# Patient Record
Sex: MALE | Race: OTHER | HISPANIC OR LATINO | URBAN - METROPOLITAN AREA
[De-identification: names, ages, dates, MRNs, and addresses within clinical notes are randomized per-mention and may not be internally consistent; named-entity substitution may affect disease eponyms.]

---

## 2022-12-10 ENCOUNTER — EMERGENCY (EMERGENCY)
Facility: HOSPITAL | Age: 32
LOS: 0 days | Discharge: HOME | End: 2022-12-10
Attending: EMERGENCY MEDICINE
Payer: SELF-PAY

## 2022-12-10 VITALS
RESPIRATION RATE: 18 BRPM | OXYGEN SATURATION: 100 % | SYSTOLIC BLOOD PRESSURE: 134 MMHG | TEMPERATURE: 98 F | DIASTOLIC BLOOD PRESSURE: 68 MMHG

## 2022-12-10 VITALS — WEIGHT: 160.06 LBS
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
## 2022-12-10 DIAGNOSIS — Z87.898 PERSONAL HISTORY OF OTHER SPECIFIED CONDITIONS: ICD-10-CM

## 2022-12-10 DIAGNOSIS — Z91.14 PATIENT'S OTHER NONCOMPLIANCE WITH MEDICATION REGIMEN: ICD-10-CM

## 2022-12-10 DIAGNOSIS — T42.4X6A UNDERDOSING OF BENZODIAZEPINES, INITIAL ENCOUNTER: ICD-10-CM

## 2022-12-10 DIAGNOSIS — F13.939 SEDATIVE, HYPNOTIC OR ANXIOLYTIC USE, UNSPECIFIED WITH WITHDRAWAL, UNSPECIFIED: ICD-10-CM

## 2022-12-10 DIAGNOSIS — R25.1 TREMOR, UNSPECIFIED: ICD-10-CM

## 2022-12-10 DIAGNOSIS — T46.5X6A UNDERDOSING OF OTHER ANTIHYPERTENSIVE DRUGS, INITIAL ENCOUNTER: ICD-10-CM

## 2022-12-10 DIAGNOSIS — I10 ESSENTIAL (PRIMARY) HYPERTENSION: ICD-10-CM

## 2022-12-10 DIAGNOSIS — F41.9 ANXIETY DISORDER, UNSPECIFIED: ICD-10-CM

## 2022-12-10 DIAGNOSIS — R07.9 CHEST PAIN, UNSPECIFIED: ICD-10-CM

## 2022-12-10 DIAGNOSIS — R63.0 ANOREXIA: ICD-10-CM

## 2022-12-10 LAB
ALBUMIN SERPL ELPH-MCNC: 4.1 G/DL — SIGNIFICANT CHANGE UP (ref 3.3–5)
ALP SERPL-CCNC: 77 U/L — SIGNIFICANT CHANGE UP (ref 40–120)
ALT FLD-CCNC: 42 U/L — SIGNIFICANT CHANGE UP (ref 12–78)
ANION GAP SERPL CALC-SCNC: 6 MMOL/L — SIGNIFICANT CHANGE UP (ref 5–17)
AST SERPL-CCNC: 21 U/L — SIGNIFICANT CHANGE UP (ref 15–37)
BASOPHILS # BLD AUTO: 0.04 K/UL — SIGNIFICANT CHANGE UP (ref 0–0.2)
BASOPHILS NFR BLD AUTO: 0.4 % — SIGNIFICANT CHANGE UP (ref 0–2)
BILIRUB SERPL-MCNC: 0.3 MG/DL — SIGNIFICANT CHANGE UP (ref 0.2–1.2)
BUN SERPL-MCNC: 16 MG/DL — SIGNIFICANT CHANGE UP (ref 7–23)
CALCIUM SERPL-MCNC: 9.4 MG/DL — SIGNIFICANT CHANGE UP (ref 8.5–10.1)
CHLORIDE SERPL-SCNC: 105 MMOL/L — SIGNIFICANT CHANGE UP (ref 96–108)
CO2 SERPL-SCNC: 27 MMOL/L — SIGNIFICANT CHANGE UP (ref 22–31)
CREAT SERPL-MCNC: 0.82 MG/DL — SIGNIFICANT CHANGE UP (ref 0.5–1.3)
EGFR: 120 ML/MIN/1.73M2 — SIGNIFICANT CHANGE UP
EOSINOPHIL # BLD AUTO: 0.63 K/UL — HIGH (ref 0–0.5)
EOSINOPHIL NFR BLD AUTO: 6.5 % — HIGH (ref 0–6)
ETHANOL SERPL-MCNC: <10 MG/DL — SIGNIFICANT CHANGE UP (ref 0–10)
GLUCOSE SERPL-MCNC: 106 MG/DL — HIGH (ref 70–99)
HCT VFR BLD CALC: 46.4 % — SIGNIFICANT CHANGE UP (ref 39–50)
HGB BLD-MCNC: 15.8 G/DL — SIGNIFICANT CHANGE UP (ref 13–17)
IMM GRANULOCYTES NFR BLD AUTO: 0.2 % — SIGNIFICANT CHANGE UP (ref 0–0.9)
LIDOCAIN IGE QN: 105 U/L — SIGNIFICANT CHANGE UP (ref 73–393)
LYMPHOCYTES # BLD AUTO: 2.61 K/UL — SIGNIFICANT CHANGE UP (ref 1–3.3)
LYMPHOCYTES # BLD AUTO: 26.9 % — SIGNIFICANT CHANGE UP (ref 13–44)
MCHC RBC-ENTMCNC: 33.1 PG — SIGNIFICANT CHANGE UP (ref 27–34)
MCHC RBC-ENTMCNC: 34.1 GM/DL — SIGNIFICANT CHANGE UP (ref 32–36)
MCV RBC AUTO: 97.3 FL — SIGNIFICANT CHANGE UP (ref 80–100)
MONOCYTES # BLD AUTO: 0.77 K/UL — SIGNIFICANT CHANGE UP (ref 0–0.9)
MONOCYTES NFR BLD AUTO: 7.9 % — SIGNIFICANT CHANGE UP (ref 2–14)
NEUTROPHILS # BLD AUTO: 5.64 K/UL — SIGNIFICANT CHANGE UP (ref 1.8–7.4)
NEUTROPHILS NFR BLD AUTO: 58.1 % — SIGNIFICANT CHANGE UP (ref 43–77)
PCP SPEC-MCNC: SIGNIFICANT CHANGE UP
PLATELET # BLD AUTO: 337 K/UL — SIGNIFICANT CHANGE UP (ref 150–400)
POTASSIUM SERPL-MCNC: 4 MMOL/L — SIGNIFICANT CHANGE UP (ref 3.5–5.3)
POTASSIUM SERPL-SCNC: 4 MMOL/L — SIGNIFICANT CHANGE UP (ref 3.5–5.3)
PROT SERPL-MCNC: 8.1 GM/DL — SIGNIFICANT CHANGE UP (ref 6–8.3)
RBC # BLD: 4.77 M/UL — SIGNIFICANT CHANGE UP (ref 4.2–5.8)
RBC # FLD: 12.8 % — SIGNIFICANT CHANGE UP (ref 10.3–14.5)
SODIUM SERPL-SCNC: 138 MMOL/L — SIGNIFICANT CHANGE UP (ref 135–145)
WBC # BLD: 9.71 K/UL — SIGNIFICANT CHANGE UP (ref 3.8–10.5)
WBC # FLD AUTO: 9.71 K/UL — SIGNIFICANT CHANGE UP (ref 3.8–10.5)

## 2022-12-10 PROCEDURE — 36415 COLL VENOUS BLD VENIPUNCTURE: CPT

## 2022-12-10 PROCEDURE — 93010 ELECTROCARDIOGRAM REPORT: CPT

## 2022-12-10 PROCEDURE — 93005 ELECTROCARDIOGRAM TRACING: CPT

## 2022-12-10 PROCEDURE — 85025 COMPLETE CBC W/AUTO DIFF WBC: CPT

## 2022-12-10 PROCEDURE — 80307 DRUG TEST PRSMV CHEM ANLYZR: CPT

## 2022-12-10 PROCEDURE — 99285 EMERGENCY DEPT VISIT HI MDM: CPT

## 2022-12-10 PROCEDURE — 96374 THER/PROPH/DIAG INJ IV PUSH: CPT

## 2022-12-10 PROCEDURE — 99284 EMERGENCY DEPT VISIT MOD MDM: CPT | Mod: 25

## 2022-12-10 PROCEDURE — 80053 COMPREHEN METABOLIC PANEL: CPT

## 2022-12-10 PROCEDURE — 83690 ASSAY OF LIPASE: CPT

## 2022-12-10 RX ORDER — CLONAZEPAM 1 MG
1 TABLET ORAL
Qty: 8 | Refills: 0
Start: 2022-12-10 | End: 2022-12-13

## 2022-12-10 RX ORDER — CLONAZEPAM 1 MG
2 TABLET ORAL ONCE
Refills: 0 | Status: DISCONTINUED | OUTPATIENT
Start: 2022-12-10 | End: 2022-12-10

## 2022-12-10 RX ORDER — SODIUM CHLORIDE 9 MG/ML
1000 INJECTION INTRAMUSCULAR; INTRAVENOUS; SUBCUTANEOUS ONCE
Refills: 0 | Status: COMPLETED | OUTPATIENT
Start: 2022-12-10 | End: 2022-12-10

## 2022-12-10 RX ADMIN — Medication 2 MILLIGRAM(S): at 16:04

## 2022-12-10 RX ADMIN — Medication 1 MILLIGRAM(S): at 16:04

## 2022-12-10 RX ADMIN — SODIUM CHLORIDE 1000 MILLILITER(S): 9 INJECTION INTRAMUSCULAR; INTRAVENOUS; SUBCUTANEOUS at 16:05

## 2022-12-10 NOTE — ED PROVIDER NOTE - MUSCULOSKELETAL, MLM
Spine appears normal, range of motion is not limited, no muscle or joint tenderness. TSE x4. No focal swelling or tenderness.

## 2022-12-10 NOTE — ED STATDOCS - PROGRESS NOTE DETAILS
Fermín Palm for Dr. Anderson:  Emirati ID: [id number]  31 y/o M w/ PMHx of anxiety and HTN presents to ED c/o dizziness and blurry vision for the past 3 days. Pt states that he ran out of pills, clonazapam which he takes 2ml twice a day, which he was prescribed in [facility] 3 years ago. Pt also reports that he's a former drinker and that's what caused his anxiety, stopped drinking 9 months ago. Pt is also tremulous. Sending to [facility] for further eval.

## 2022-12-10 NOTE — ED PROVIDER NOTE - PATIENT PORTAL LINK FT
You can access the FollowMyHealth Patient Portal offered by [facility] by registering at the following website: [email or web address]. By joining [facility]’s FollowMyHealth portal, you will also be able to view your health information using other applications (apps) compatible with our system.

## 2022-12-10 NOTE — ED PROVIDER NOTE - OBJECTIVE STATEMENT
33 y/o male with PMHx of HTN, anxiety presents to the ED c/o tremors, poor PO intake, nausea, HA, CP x3 days. Pt states that he ran out of Clonazepam 4 days ago (takes 2mg twice a day) which he was prescribed in [facility] 3 years ago. Pt also reports that he's a former drinker and that's what caused his anxiety, stopped drinking 9 months ago. Reports that he is also unable to sleep. When pt was running low on medication he went to pharmacy and was told that he needed to come to the ED in order to obtain a prescription for Clonazepam. Denies SI, HI. Pt reports that he also ran out of his blood pressure medication 15 days ago, does not remember name of medication.    ID#: [id number] 33 y/o male with PMHx of HTN, anxiety, ambulatory to the ED c/o tremors, poor PO intake, nausea, HA, CP x3 days. Pt states that he ran out of Clonazepam 4 days ago (takes 2mg twice a day) which he was prescribed in [facility] 3 years ago. Pt also reports that he's a former drinker and that's what caused his anxiety, stopped drinking 9 months ago. Reports that he is also unable to sleep. When pt was running low on medication he went to pharmacy and was told that he needed to come to the ED in order to obtain a prescription for Clonazepam. Denies SI, HI. Pt reports that he also ran out of his blood pressure medication 15 days ago, does not remember name of medication.  Pt initially evaluated at ED Intake, noted to be tremulous, very anxious, expedited to Main ED for further eval & management.   ID#: [id number]

## 2022-12-10 NOTE — ED PROVIDER NOTE - NSFOLLOWUPINSTRUCTIONS_ED_ALL_ED_FT
Take Clonazepam as prescribed.  Follow up this upcoming week at [facility] at [place], [phone].        Control de la ansiedad en los adultos    Managing Anxiety, Adult      Después de esteban recibido un diagnóstico de ansiedad, es posible que se sienta aliviado al saber por qué se había sentido o había actuado de cierto modo. Es posible que también se sienta abrumado por el tratamiento que tiene por michelle y por lo que chanel significará para vásquez gaudencio. Con atención y apoyo, usted puede controlar esta afección.      Cómo manejar los cambios en el estilo de gaudencio      Control del estrés y la ansiedad   An adult doing mindful breathing while practicing yoga.   El estrés es la reacción del cuerpo ante los cambios y los acontecimientos de la gaudencio, tanto buenos miko malos. La mayoría de los episodios de estrés maguire sólo algunas horas, alber el estrés puede ser continuo y conducir a más que solo estrés. Aunque el estrés puede desempeñar un papel importante en la ansiedad, no es lo mismo que la ansiedad. El estrés generalmente es causado por algo externo, miko emma fecha límite, emma prueba o emma competencia. El estrés normalmente pasa después de que el evento desencadenante melo terminado.     La ansiedad es causada por algo interno, por ejemplo, imaginar un resultado terrible o preocuparse porque algo irá mal y lo devastará. A menudo, la ansiedad no desaparece incluso después de que el evento desencadenante ha finalizado y puede tornarse en emma preocupación a sharyn plazo (crónica). Es importante comprender las diferencias entre el estrés y la ansiedad, y controlar el estrés de manera efectiva para que no genere emma respuesta de ansiedad.    Hable con el médico o un consejero para obtener más información sobre cómo reducir la ansiedad y el estrés. Es posible que el profesional sugiera técnicas para reducir la tensión, tales miko:  •Musicoterapia. Pase tiempo creando o escuchando música que disfrute y lo inspire.      •Meditación consciente. Practique el estar consciente de la respiración normal sin intentar controlarla. Puede realizarse mientras está sentado o camina.      •Oración centrante. Tashua implica centrarse en emma palabra, frase o imagen sagrada que le signifique algo y le genere skelton.      •Respiración profunda. Para hacer esto, expanda el estómago e inhale lentamente por la nariz. Mantenga el aire tawanna unos 3 a 5 segundos. Luego, exhale lentamente mientras jonathan que los músculos del estómago se relajen.      •Diálogo interno. Aprenda a notar e identificar patrones de pensamiento que conducen a reacciones de ansiedad y cambie esos patrones a pensamientos que se transmitan tranquilidad.      •Relajación muscular. Dedique tiempo a tensar los músculos y luego relajarlos.      Elija emma técnica para reducir la tensión que se adapte a vásquez estilo de gaudencio y vásquez personalidad. Estas técnicas llevan tiempo y práctica. Resérvese de 5 a 15 minutos por día para hacerlas. Algunos terapeutas pueden ofrecer orientación y capacitación en estas técnicas. Es posible que algunos planes de seguros médicos cubran la capacitación.    Otras cosas que puede hacer para controlar el estrés y la ansiedad incluyen:  •Llevar un registro del estrés. Tashua puede ayudarlo a identificar qué le desencadena vásquez reacción y, luego, aprender las maneras de controlar vásquez respuesta al respecto.      •Pensar en cómo reacciona ante ciertas situaciones. Es posible que no sea capaz de controlar todo, alber puede controlar vásquez respuesta.      •Hacerse tiempo para las actividades que lo ayudan a relajarse y no sentir culpa por pasar vásquez tiempo de chanel modo.      •Crear imágenes visuales. Tashua implica imaginar o crear imágenes mentales para ayudarlo a relajarse.      •Practicar yoga. A través del yoga, puede disminuir la tensión y favorecer la relajación.      Medicamentos     Los medicamentos pueden ayudar a aliviar los síntomas. Algunos medicamentos para la ansiedad:  •Antidepresivos. Por lo general, se recetan para el control diario a sharyn plazo.      •Medicamentos para la ansiedad. Estos se pueden agregar en casos graves, especialmente cuando ocurren crisis de angustia.      Un médico recetará los medicamentos. Cuando se usan juntos, los medicamentos, la psicoterapia y las técnicas de reducción de la tensión pueden ser el tratamiento más efectivo.    Las relaciones    Las relaciones interpersonales pueden ser muy importantes para ayudar a vásquez recuperación. Intente pasar más tiempo interactuando con amigos y familiares de confianza.  •Considere la posibilidad de ir a terapia de rachael, si tiene emma rachael, ace clases de educación familiar o ir a terapia familiar.      •La terapia puede ayudarlos a usted y a los demás a comprender mejor vásquez afección.        Cómo reconocer cambios en vásquez ansiedad    Cada persona responde de manera diferente al tratamiento de la ansiedad. Se dice que está recuperado de la ansiedad cuando los síntomas disminuyen y waleska de interferir en las actividades diarias en el hogar o el trabajo. Tashua podría significar que comenzará a hacer lo siguiente:  •Tener mejor concentración y atención. Tener menos interferencia de la preocupación en el pensamiento diario.      •Dormir mejor.      •Estar menos irritable.      •Tener más energía.      •Tener mejor memoria.      También es importante reconocer cuando vásquez afección empeora. Comuníquese con el médico si whitley síntomas interfieren en vásquez hogar o vásquez trabajo, y usted siente que vásquez afección no está mejorando.      Siga estas instrucciones en vásquez casa:    Actividad   •Actividad física. Los adultos deben hacer lo siguiente:  •Realizar, al menos, 150 minutos de actividad física por semana. El ejercicio debe aumentar la frecuencia cardíaca y hacerlo transpirar (ejercicio de intensidad moderada).      •Realizar ejercicios de fortalecimiento por lo menos dos veces por semana.        •Dormir alejandra y por el tiempo adecuado. La mayoría de los adultos necesitan entre 7 y 9 horas de sueño todas las noches.        Estilo de gaudencio   Two adults cook together in a kitchen. Fruit and vegetables are on the counter in front of them. •Siga emma dieta saludable que incluya abundantes frutas, verduras, cereales integrales, productos lácteos descremados y proteínas magras.  •No consuma muchos alimentos con alto contenido de grasas, azúcares agregados o sal (sodio).        •Opte por cosas que le simplifiquen la gaudencio.      • No consuma ningún producto que contenga nicotina o tabaco. Estos productos incluyen cigarrillos, tabaco para mascar y aparatos de vapeo, miko los cigarrillos electrónicos. Si necesita ayuda para dejar de fumar, consulte al médico.      •Evite el consumo de cafeína, alcohol y ciertos medicamentos contra el resfrío de venta sin receta. Estos podrían hacerlo sentir peor. Pregúntele al farmacéutico qué medicamentos no debería ace.      Instrucciones generales     •Use los medicamentos de venta autumn y los recetados solamente miko se lo haya indicado el médico.      •Concurra a todas las visitas de seguimiento. Tashua es importante.        Dónde buscar apoyo    Puede conseguir ayuda y apoyo en los siguientes lugares:  •Grupos de autoayuda.      •Organizaciones comunitarias y en línea.      •Un líder espiritual de confianza.      •Terapia de rachael.      •Clases de educación familiar.      •Terapia familiar.        Dónde obtener más información    Formar parte de un babak de apoyo podría resultarle útil para enfrentar la ansiedad. Las siguientes skinner pueden ayudarlo a localizar consejeros o grupos de apoyo cerca de vásquez hogar:  •Mental Health [place] ([place]y Mental de los [place]): www.mentalhealthamerica.net      •Anxiety and Depression Association of [place] [ADAA] (Asociación de Ansiedad y Depresión de los [place]): www.adaa.org      •National [place] on Mental Illness [ASTER] ([place] Nacional Sobre Enfermedades Mentales): www.aster.org        Comuníquese con un médico si:    •Le resulta difícil permanecer concentrado o finalizar las tareas diarias.      •Pasa muchas horas por día sintiéndose preocupado por la gaudencio cotidiana.      •La preocupación le provoca un cansancio extremo.      •Comienza a tener leland de leon o se siente tenso con frecuencia.      •Tiene náuseas crónicas y/o diarrea.        Solicite ayuda de inmediato si:    •Se le acelera la frecuencia cardíaca y le kendell respirar.      •Piensa acerca de lastimarse a usted misma o a otras personas.      Si alguna vez siente que puede lastimarse o lastimar a otras personas, o tiene pensamientos de poner fin a vásquez gaudencio, busque ayuda de inmediato. Diríjase al servicio de urgencias más cercano o:   • Comuníquese con el servicio de emergencias de vásquez localidad (911 en los [place]).       • Llame a emma línea de asistencia al suicida y atención en crisis miko National Suicide Prevention Lifeline (Línea Nacional de Prevención del Suicidio) al [phone]. Está disponible las 24 horas del día en los . [place].       • Envíe un mensaje de texto a la línea para casos de crisis al [id number] (en los . [place].).         Resumen    •Ace medidas para aprender y usar técnicas de reducción de la tensión pueden ayudarlo a calmarse y a evitar desencadenar emma reacción de ansiedad.      •Cuando se usan juntos, los medicamentos, la psicoterapia y las técnicas de reducción de la tensión pueden ser el tratamiento más efectivo.      •La cary, los amigos y las parejas pueden desempeñar un papel importante para brindarle apoyo.      Esta información no tiene miko fin reemplazar el consejo del médico. Asegúrese de hacerle al médico cualquier pregunta que tenga.

## 2022-12-10 NOTE — ED ADULT NURSE NOTE - OBJECTIVE STATEMENT
ID [id number] pt c/o feeling sick, dizziness, sweating, blurry vision, sob x 3 years.  pt ran out of pills, clonazapam, which he takes it for HTN, last dose of clonazepam 4 days ago.  pt from [facility]. No other complaints at this time.

## 2022-12-10 NOTE — ED PROVIDER NOTE - CLINICAL SUMMARY MEDICAL DECISION MAKING FREE TEXT BOX
31 y/o Hisapnic male, Belarusian speaking with PMHx of HTN, anxiety ambulatory to ED very anxious c/o ran out of blood pressure med 2 weeks ago and anxiety medication 4-5 days ago with 3 days of anxiety and insomnia. Pt tremulous on arrival, sent to main ED for further evaluation. Pt evaluated after PO meds: calm, no active complaints. VSS. Physical exam unremarkable. Plan: EKG, labs including Utox, PO Klonopin IV ativan, IV fluid. Post medication pt calm, alert, NAD.  Labs normal. Pt stable for dc with prescription for Clonazepam. 33 y/o Hisapnic male, Icelandic speaking with PMHx of HTN, anxiety, ambulatory to ED very anxious c/o ran out of unknown blood pressure med 2 weeks ago and anxiety medication 4-5 days ago with 3 days of anxiety and insomnia. Pt tremulous on arrival, sent to main ED for further evaluation/management. Pt evaluated after PO/IV meds: calm, no active complaints. VSS. Physical exam unremarkable.   Plan: EKG, labs including Utox, PO Klonopin IV ativan, IV fluid. Post medication pt calm, alert, NAD.  Labs normal. Pt stable for dc with prescription for Clonazepam.

## 2022-12-10 NOTE — ED PROVIDER NOTE - CARE PLAN
1 Principal Discharge DX:	Anxiety  Secondary Diagnosis:	Benzodiazepine withdrawal, with unspecified complication

## 2022-12-10 NOTE — ED PROVIDER NOTE - NEUROLOGICAL, MLM
AAOx3, normal speech. Cranial nerves 2-12 intact. No focal sensory motor deficits. AAOx3, normal speech. Cranial nerves 2-12 intact. No focal sensory motor deficits.  No active tremors.

## 2022-12-10 NOTE — ED STATDOCS - PHYSICAL EXAMINATION
Constitutional: NAD AOx3  Eyes: PERRL EOMI  Head: Normocephalic atraumatic  Mouth: MMM  Cardiac: regular rate and rhythm  Resp: Lungs CTAB  GI: Abd s/nd/nt  Neuro: CN2-12 grossly intact, TSE x 4  Skin: No visible rashes

## 2022-12-10 NOTE — ED PROVIDER NOTE - PSYCHIATRIC, MLM
Alert and oriented to person, place, time/situation. normal mood and affect, + calm, cooperative. no apparent risk to self or others.

## 2022-12-10 NOTE — ED ADULT NURSE NOTE - CHIEF COMPLAINT QUOTE
ID [id number] pt c/o feeling sick, dizziness, sweating, blurry vision, sob x 3 years.  pt ran out of pills, clonazapam, which he takes it for HTN, last dose of clonazepam 4 days ago.  pt from [facility].
